# Patient Record
Sex: FEMALE | Race: WHITE | NOT HISPANIC OR LATINO | Employment: FULL TIME | URBAN - METROPOLITAN AREA
[De-identification: names, ages, dates, MRNs, and addresses within clinical notes are randomized per-mention and may not be internally consistent; named-entity substitution may affect disease eponyms.]

---

## 2023-05-18 ENCOUNTER — OFFICE VISIT (OUTPATIENT)
Dept: INTERNAL MEDICINE | Facility: CLINIC | Age: 33
End: 2023-05-18
Payer: COMMERCIAL

## 2023-05-18 VITALS
HEIGHT: 64 IN | BODY MASS INDEX: 22.2 KG/M2 | DIASTOLIC BLOOD PRESSURE: 81 MMHG | OXYGEN SATURATION: 97 % | SYSTOLIC BLOOD PRESSURE: 122 MMHG | TEMPERATURE: 98.4 F | HEART RATE: 85 BPM | WEIGHT: 130 LBS

## 2023-05-18 DIAGNOSIS — K64.9 HEMORRHOIDS, UNSPECIFIED HEMORRHOID TYPE: Chronic | ICD-10-CM

## 2023-05-18 DIAGNOSIS — K56.41 IMPACTED STOOL IN RECTUM: Chronic | ICD-10-CM

## 2023-05-18 DIAGNOSIS — Z13.220 SCREENING FOR LIPID DISORDERS: ICD-10-CM

## 2023-05-18 DIAGNOSIS — K59.00 CONSTIPATION, UNSPECIFIED CONSTIPATION TYPE: Chronic | ICD-10-CM

## 2023-05-18 DIAGNOSIS — Z00.00 ENCOUNTER FOR MEDICAL EXAMINATION TO ESTABLISH CARE: Primary | ICD-10-CM

## 2023-05-18 DIAGNOSIS — Z11.59 NEED FOR HEPATITIS C SCREENING TEST: ICD-10-CM

## 2023-05-18 DIAGNOSIS — Z13.29 SCREENING FOR THYROID DISORDER: ICD-10-CM

## 2023-05-18 LAB
ALBUMIN SERPL-MCNC: 4.3 G/DL (ref 3.5–5.2)
ALBUMIN/GLOB SERPL: 1.6 G/DL
ALP SERPL-CCNC: 30 U/L (ref 39–117)
ALT SERPL W P-5'-P-CCNC: 18 U/L (ref 1–33)
ANION GAP SERPL CALCULATED.3IONS-SCNC: 9 MMOL/L (ref 5–15)
AST SERPL-CCNC: 27 U/L (ref 1–32)
BASOPHILS # BLD AUTO: 0.04 10*3/MM3 (ref 0–0.2)
BASOPHILS NFR BLD AUTO: 1.1 % (ref 0–1.5)
BILIRUB SERPL-MCNC: 0.4 MG/DL (ref 0–1.2)
BUN SERPL-MCNC: 17 MG/DL (ref 6–20)
BUN/CREAT SERPL: 27.4 (ref 7–25)
CALCIUM SPEC-SCNC: 9.2 MG/DL (ref 8.6–10.5)
CHLORIDE SERPL-SCNC: 106 MMOL/L (ref 98–107)
CHOLEST SERPL-MCNC: 143 MG/DL (ref 0–200)
CO2 SERPL-SCNC: 23 MMOL/L (ref 22–29)
CREAT SERPL-MCNC: 0.62 MG/DL (ref 0.57–1)
DEPRECATED RDW RBC AUTO: 45.5 FL (ref 37–54)
EGFRCR SERPLBLD CKD-EPI 2021: 120 ML/MIN/1.73
EOSINOPHIL # BLD AUTO: 0.13 10*3/MM3 (ref 0–0.4)
EOSINOPHIL NFR BLD AUTO: 3.6 % (ref 0.3–6.2)
ERYTHROCYTE [DISTWIDTH] IN BLOOD BY AUTOMATED COUNT: 12.9 % (ref 12.3–15.4)
GLOBULIN UR ELPH-MCNC: 2.7 GM/DL
GLUCOSE SERPL-MCNC: 75 MG/DL (ref 65–99)
HCT VFR BLD AUTO: 35.6 % (ref 34–46.6)
HDLC SERPL-MCNC: 53 MG/DL (ref 40–60)
HGB BLD-MCNC: 12 G/DL (ref 12–15.9)
IMM GRANULOCYTES # BLD AUTO: 0.01 10*3/MM3 (ref 0–0.05)
IMM GRANULOCYTES NFR BLD AUTO: 0.3 % (ref 0–0.5)
LDLC SERPL CALC-MCNC: 80 MG/DL (ref 0–100)
LDLC/HDLC SERPL: 1.54 {RATIO}
LYMPHOCYTES # BLD AUTO: 1.2 10*3/MM3 (ref 0.7–3.1)
LYMPHOCYTES NFR BLD AUTO: 33.1 % (ref 19.6–45.3)
MCH RBC QN AUTO: 32.2 PG (ref 26.6–33)
MCHC RBC AUTO-ENTMCNC: 33.7 G/DL (ref 31.5–35.7)
MCV RBC AUTO: 95.4 FL (ref 79–97)
MONOCYTES # BLD AUTO: 0.34 10*3/MM3 (ref 0.1–0.9)
MONOCYTES NFR BLD AUTO: 9.4 % (ref 5–12)
NEUTROPHILS NFR BLD AUTO: 1.9 10*3/MM3 (ref 1.7–7)
NEUTROPHILS NFR BLD AUTO: 52.5 % (ref 42.7–76)
NRBC BLD AUTO-RTO: 0 /100 WBC (ref 0–0.2)
PLATELET # BLD AUTO: 284 10*3/MM3 (ref 140–450)
PMV BLD AUTO: 10.8 FL (ref 6–12)
POTASSIUM SERPL-SCNC: 4 MMOL/L (ref 3.5–5.2)
PROT SERPL-MCNC: 7 G/DL (ref 6–8.5)
RBC # BLD AUTO: 3.73 10*6/MM3 (ref 3.77–5.28)
SODIUM SERPL-SCNC: 138 MMOL/L (ref 136–145)
TRIGL SERPL-MCNC: 41 MG/DL (ref 0–150)
TSH SERPL DL<=0.05 MIU/L-ACNC: 1.73 UIU/ML (ref 0.27–4.2)
VLDLC SERPL-MCNC: 10 MG/DL (ref 5–40)
WBC NRBC COR # BLD: 3.62 10*3/MM3 (ref 3.4–10.8)

## 2023-05-18 PROCEDURE — 80053 COMPREHEN METABOLIC PANEL: CPT | Performed by: NURSE PRACTITIONER

## 2023-05-18 PROCEDURE — 84443 ASSAY THYROID STIM HORMONE: CPT | Performed by: NURSE PRACTITIONER

## 2023-05-18 PROCEDURE — 80061 LIPID PANEL: CPT | Performed by: NURSE PRACTITIONER

## 2023-05-18 PROCEDURE — 85025 COMPLETE CBC W/AUTO DIFF WBC: CPT | Performed by: NURSE PRACTITIONER

## 2023-05-18 PROCEDURE — 86803 HEPATITIS C AB TEST: CPT | Performed by: NURSE PRACTITIONER

## 2023-05-18 RX ORDER — HYDROCORTISONE ACETATE 25 MG/1
25 SUPPOSITORY RECTAL 2 TIMES DAILY
Qty: 24 EACH | Refills: 3 | Status: SHIPPED | OUTPATIENT
Start: 2023-05-18

## 2023-05-18 NOTE — PROGRESS NOTES
"Chief Complaint  Establish Care and Hemorrhoids (Had them for 10 years - large and wanting to see gastro for removal. )    Subjective          Claire BUTCHER presents to [facility]  Hemorrhoids  This is a recurrent problem. The current episode started more than 1 year ago. The problem occurs constantly. The problem has been gradually worsening. Associated symptoms include a change in bowel habit (has issues with constipation with occassional impaction). Pertinent negatives include no abdominal pain, anorexia, arthralgias, chest pain, chills, congestion, coughing, diaphoresis, fatigue, fever, headaches, joint swelling, myalgias, nausea, neck pain, numbness, rash, sore throat, swollen glands, urinary symptoms, vertigo, visual change, vomiting or weakness.       Previous PCP: [facility]   Specialist(s): None  COVID vaccine: Refused   Colon cancer screening: Patient is not of age. Never done prior. No FHx of Colon Cancer.   Mammogram: Patient is not of age. Never done prior.   Pap Smear: 5 years ago.       Current Outpatient Medications   Medication Instructions   • hydrocortisone (ANUSOL-HC) 25 mg, Rectal, 2 Times Daily       The following portions of the patient's history were reviewed and updated as appropriate: allergies, current medications, past family history, past medical history, past social history, past surgical history, and problem list.    Objective   Vital Signs:   /81 (BP Location: Left arm, Patient Position: Sitting, Cuff Size: Adult)   Pulse 85   Temp 98.4 °F (36.9 °C) (Temporal)   Ht 162.6 cm (64\")   Wt 59 kg (130 lb)   SpO2 97%   BMI 22.31 kg/m²     Wt Readings from Last 3 Encounters:   05/18/23 59 kg (130 lb)     BP Readings from Last 3 Encounters:   05/18/23 122/81     Physical Exam   Appearance: No acute distress, well-nourished  Head: normocephalic, atraumatic  Eyes: extraocular movements intact, no scleral icterus, no " conjunctival injection  Ears, Nose, and Throat: external ears normal, nares patent, moist mucous membranes  Cardiovascular: regular rate and rhythm. no murmurs, rubs, or gallops. no edema  Respiratory: breathing comfortably, symmetric chest rise, clear to auscultation bilaterally. No wheezes, rales, or rhonchi.  Neuro: alert and oriented to time, place, and person. Normal gait  Psych: normal mood and affect     Result Review :   The following data was reviewed by: OSWALDO Washington on 05/18/2023:           No results found for: SARSANTIGEN, COVID19, RAPFLUA, RAPFLUB, FLUAAG, FLUABDAG, FLUABDAG, FLU, FLU, FLUBAG, RAPSCRN, STREPAAG, RSV, POCPREGUR, MONOSPOT, INR, LEADCAPBLD, POCLEAD, BILIRUBINUR    Procedures        Assessment and Plan    Diagnoses and all orders for this visit:    1. Encounter for medical examination to establish care (Primary)    2. Need for hepatitis C screening test  -     HCV Antibody Rfx To Qnt PCR    3. Screening for thyroid disorder  -     TSH Rfx On Abnormal To Free T4    4. Screening for lipid disorders  -     Lipid panel    5. Hemorrhoids, unspecified hemorrhoid type  -     Ambulatory Referral to Gastroenterology  -     Ambulatory Referral to General Surgery  -     hydrocortisone (ANUSOL-HC) 25 MG suppository; Insert 1 suppository into the rectum 2 (Two) Times a Day.  Dispense: 24 each; Refill: 3    6. Constipation, unspecified constipation type  Comments:  increase water intake and fiber intake.   Orders:  -     CBC w AUTO Differential  -     Comprehensive metabolic panel    7. Impacted stool in rectum          There are no discontinued medications.       Follow Up   Return in about 6 weeks (around 6/29/2023) for PAP.  Patient was given instructions and counseling regarding her condition or for health maintenance advice. Please see specific information pulled into the AVS if appropriate.       OSWALDO Washington  05/18/23  12:14 EDT

## 2023-05-19 ENCOUNTER — TELEPHONE (OUTPATIENT)
Dept: INTERNAL MEDICINE | Facility: CLINIC | Age: 33
End: 2023-05-19
Payer: COMMERCIAL

## 2023-05-19 LAB
HCV AB SERPL QL IA: NORMAL
HCV IGG SERPL QL IA: NON REACTIVE

## 2023-05-19 NOTE — TELEPHONE ENCOUNTER
Caller: Claire BUTCHER    Relationship: Self    Best call back number: [phone]     What is the best time to reach you: ANY, VOICEMAIL OK    Who are you requesting to speak with (clinical staff, provider,  specific staff member): CLINICAL      What was the call regarding: PATIENT CALLED WITH QUESTIONS ABOUT HER REFERRALS FOR HEMORRHOIDS. SHE STATES THAT GENERAL SURGERY HAS ALREADY CALLED HER TO SCHEDULE BUT SHE WANTS TO MAKE SURE SHE WASN'T SUPPOSED TO SEE GASTRO FIRST.     Do you require a callback: YES

## 2023-05-24 ENCOUNTER — OFFICE VISIT (OUTPATIENT)
Dept: INTERNAL MEDICINE | Facility: CLINIC | Age: 33
End: 2023-05-24
Payer: COMMERCIAL

## 2023-05-24 VITALS
HEIGHT: 64 IN | HEART RATE: 96 BPM | WEIGHT: 128.8 LBS | SYSTOLIC BLOOD PRESSURE: 106 MMHG | OXYGEN SATURATION: 97 % | DIASTOLIC BLOOD PRESSURE: 75 MMHG | TEMPERATURE: 98.5 F | BODY MASS INDEX: 21.99 KG/M2

## 2023-05-24 DIAGNOSIS — J02.9 PHARYNGITIS, UNSPECIFIED ETIOLOGY: Primary | ICD-10-CM

## 2023-05-24 DIAGNOSIS — Z72.0 CURRENT EVERY DAY NICOTINE VAPING: ICD-10-CM

## 2023-05-24 DIAGNOSIS — J02.9 SORE THROAT: ICD-10-CM

## 2023-05-24 DIAGNOSIS — M54.2 NECK PAIN: ICD-10-CM

## 2023-05-24 DIAGNOSIS — R50.9 FEVER, UNSPECIFIED FEVER CAUSE: ICD-10-CM

## 2023-05-24 LAB
EXPIRATION DATE: NORMAL
EXPIRATION DATE: NORMAL
FLUAV AG UPPER RESP QL IA.RAPID: NOT DETECTED
FLUBV AG UPPER RESP QL IA.RAPID: NOT DETECTED
INTERNAL CONTROL: NORMAL
INTERNAL CONTROL: NORMAL
Lab: NORMAL
Lab: NORMAL
S PYO AG THROAT QL: NEGATIVE
SARS-COV-2 AG UPPER RESP QL IA.RAPID: NOT DETECTED
SARS-COV-2 RNA RESP QL NAA+PROBE: NOT DETECTED

## 2023-05-24 PROCEDURE — 87635 SARS-COV-2 COVID-19 AMP PRB: CPT | Performed by: STUDENT IN AN ORGANIZED HEALTH CARE EDUCATION/TRAINING PROGRAM

## 2023-05-24 PROCEDURE — 87081 CULTURE SCREEN ONLY: CPT | Performed by: STUDENT IN AN ORGANIZED HEALTH CARE EDUCATION/TRAINING PROGRAM

## 2023-05-24 NOTE — PROGRESS NOTES
"Chief Complaint  Sore Throat (Burning, started last night. ) and Headache    Subjective          Claire BUTCHER presents to [facility]  History of Present Illness    Here for a sick visit.  Here with complaints of sore throat and headache.  Tactile fever (not measured).  No cough or congestion.  No vomiting or diarrhea.  Tolerating PO.    Symptoms started last night.    Of note, quite smoking tobacco February last year, and now vapes nicotine containing fluid.    Current Outpatient Medications   Medication Instructions   • hydrocortisone (ANUSOL-HC) 25 mg, Rectal, 2 Times Daily   • phenol (CHLORASEPTIC) 1.4 % liquid liquid 1 spray, Mouth/Throat, Every 2 Hours PRN       The following portions of the patient's history were reviewed and updated as appropriate: allergies, current medications, past family history, past medical history, past social history, past surgical history, and problem list.    Objective   Vital Signs:   /75 (BP Location: Left arm, Patient Position: Sitting)   Pulse 96   Temp 98.5 °F (36.9 °C) (Temporal)   Ht 162.6 cm (64\")   Wt 58.4 kg (128 lb 12.8 oz)   SpO2 97%   BMI 22.11 kg/m²     Wt Readings from Last 3 Encounters:   05/24/23 58.4 kg (128 lb 12.8 oz)   05/18/23 59 kg (130 lb)     BP Readings from Last 3 Encounters:   05/24/23 106/75   05/18/23 122/81     Physical Exam  Vitals reviewed.   Constitutional:       General: She is not in acute distress.     Appearance: Normal appearance. She is not ill-appearing, toxic-appearing or diaphoretic.   HENT:      Head: Normocephalic and atraumatic.      Right Ear: External ear normal.      Left Ear: External ear normal.      Mouth/Throat:      Mouth: Mucous membranes are moist.      Pharynx: Oropharynx is clear. Posterior oropharyngeal erythema present. No oropharyngeal exudate.   Eyes:      Conjunctiva/sclera: Conjunctivae normal.   Cardiovascular:      Rate and Rhythm: Normal rate and regular " rhythm.      Pulses: Normal pulses.      Heart sounds: Normal heart sounds. No murmur heard.    No friction rub. No gallop.   Pulmonary:      Effort: Pulmonary effort is normal. No respiratory distress.      Breath sounds: Normal breath sounds. No stridor. No wheezing, rhonchi or rales.   Chest:      Chest wall: No tenderness.   Abdominal:      General: Abdomen is flat.      Palpations: Abdomen is soft. There is no mass.      Tenderness: There is no abdominal tenderness.   Musculoskeletal:      Cervical back: No rigidity.      Right lower leg: No edema.      Left lower leg: No edema.   Lymphadenopathy:      Cervical: Cervical adenopathy present.   Skin:     General: Skin is warm and dry.   Neurological:      General: No focal deficit present.      Mental Status: She is alert. Mental status is at baseline.   Psychiatric:         Mood and Affect: Mood normal.         Behavior: Behavior normal.         Thought Content: Thought content normal.         Judgment: Judgment normal.            Result Review :   The following data was reviewed by: Jimenez Enamorado MD on 05/24/2023:  Common labs        5/18/2023    11:39   Common Labs   Glucose 75     BUN 17     Creatinine 0.62     Sodium 138     Potassium 4.0     Chloride 106     Calcium 9.2     Albumin 4.3     Total Bilirubin 0.4     Alkaline Phosphatase 30     AST (SGOT) 27     ALT (SGPT) 18     WBC 3.62     Hemoglobin 12.0     Hematocrit 35.6     Platelets 284     Total Cholesterol 143     Triglycerides 41     HDL Cholesterol 53     LDL Cholesterol  80              Lab Results   Component Value Date    SARSANTIGEN Not Detected 05/24/2023    FLUAAG Not Detected 05/24/2023    FLUBAG Not Detected 05/24/2023    RAPSCRN Negative 05/24/2023       Procedures        Assessment and Plan    Diagnoses and all orders for this visit:    1. Pharyngitis, unspecified etiology (Primary)  -     phenol (CHLORASEPTIC) 1.4 % liquid liquid; Apply 1 spray to the mouth or throat Every 2 (Two)  Hours As Needed (sore throat).  Dispense: 177 mL; Refill: 0    2. Fever, unspecified fever cause  -     POCT SARS-CoV-2 Antigen SHAISTA + Flu  -     POCT rapid strep A  -     COVID-19,APTIMA PANTHER([place]),[facility], NP/OP SWAB IN UTM/VTM/SALINE TRANSPORT MEDIA,24 HR TAT - Swab, Nasopharynx    3. Sore throat  -     POCT rapid strep A  -     Beta Strep Culture, Throat - , Throat; Future  -     COVID-19,APTIMA PANTHER([place]),[facility], NP/OP SWAB IN UTM/VTM/SALINE TRANSPORT MEDIA,24 HR TAT - Swab, Nasopharynx  -     Beta Strep Culture, Throat - Swab, Throat    4. Neck pain    5. Current every day nicotine vaping      Vaping:  -counseled today on the health risks of vaping  -strongly counseled today on the importance of vaping cessation    There are no discontinued medications.       Follow Up   Return if symptoms worsen or fail to improve.  Patient was given instructions and counseling regarding her condition or for health maintenance advice. Please see specific information pulled into the AVS if appropriate.       Jimenez Enamorado MD  05/24/23  13:21 EDT

## 2023-05-25 ENCOUNTER — OFFICE VISIT (OUTPATIENT)
Dept: INTERNAL MEDICINE | Facility: CLINIC | Age: 33
End: 2023-05-25
Payer: COMMERCIAL

## 2023-05-25 VITALS
HEART RATE: 97 BPM | DIASTOLIC BLOOD PRESSURE: 65 MMHG | OXYGEN SATURATION: 97 % | BODY MASS INDEX: 21.95 KG/M2 | TEMPERATURE: 98.2 F | SYSTOLIC BLOOD PRESSURE: 99 MMHG | HEIGHT: 64 IN | WEIGHT: 128.6 LBS

## 2023-05-25 DIAGNOSIS — J02.9 PHARYNGITIS, UNSPECIFIED ETIOLOGY: Primary | ICD-10-CM

## 2023-05-25 NOTE — PROGRESS NOTES
"Chief Complaint  Sore Throat (Still not better. States she is more swollen in her throat, face and neck. Unable to turn neck to the left without significant pain. Denies any trouble breathing. Requesting abx shot. Seen at Urgent Care yesterday- put on amoxicillin. )    Subjective          Claire BUTCHER presents to [facility]  History of Present Illness    Seen at urgent care yesterday, with repeat strep testing negative.  Started on amoxicillin.  Reports throat pain worse, and would like antibiotic injection.    Had fever yesterday (100.4F at home).  Other symptoms include headache, left ear otalgia, headache.  No cough, no congestion, no vomiting, no diarrhea, no respiratory distress.  Tolerating PO.    Current Outpatient Medications   Medication Instructions   • hydrocortisone (ANUSOL-HC) 25 mg, Rectal, 2 Times Daily   • phenol (CHLORASEPTIC) 1.4 % liquid liquid 1 spray, Mouth/Throat, Every 2 Hours PRN       The following portions of the patient's history were reviewed and updated as appropriate: allergies, current medications, past family history, past medical history, past social history, past surgical history, and problem list.    Objective   Vital Signs:   BP 99/65 (BP Location: Left arm, Patient Position: Sitting)   Pulse 97   Temp 98.2 °F (36.8 °C) (Temporal)   Ht 162.6 cm (64\")   Wt 58.3 kg (128 lb 9.6 oz)   SpO2 97%   BMI 22.07 kg/m²     Wt Readings from Last 3 Encounters:   05/25/23 58.3 kg (128 lb 9.6 oz)   05/24/23 58.4 kg (128 lb 12.8 oz)   05/24/23 58.4 kg (128 lb 12.8 oz)     BP Readings from Last 3 Encounters:   05/25/23 99/65   05/24/23 109/70   05/24/23 106/75     Physical Exam  Vitals reviewed.   Constitutional:       General: She is not in acute distress.     Appearance: Normal appearance. She is not ill-appearing, toxic-appearing or diaphoretic.   HENT:      Head: Normocephalic and atraumatic.      Right Ear: Tympanic membrane, ear canal and " external ear normal.      Left Ear: Tympanic membrane, ear canal and external ear normal.      Mouth/Throat:      Pharynx: Oropharyngeal exudate present.      Comments: White patch on left tonsil, noted  Eyes:      Conjunctiva/sclera: Conjunctivae normal.   Cardiovascular:      Rate and Rhythm: Normal rate and regular rhythm.      Pulses: Normal pulses.      Heart sounds: Normal heart sounds. No murmur heard.    No friction rub. No gallop.   Pulmonary:      Effort: Pulmonary effort is normal. No respiratory distress.      Breath sounds: Normal breath sounds. No stridor. No wheezing, rhonchi or rales.   Chest:      Chest wall: No tenderness.   Abdominal:      General: Abdomen is flat.      Palpations: Abdomen is soft. There is no mass.      Tenderness: There is no abdominal tenderness.   Musculoskeletal:      Right lower leg: No edema.      Left lower leg: No edema.   Skin:     General: Skin is warm and dry.   Neurological:      General: No focal deficit present.      Mental Status: She is alert. Mental status is at baseline.   Psychiatric:         Behavior: Behavior normal.         Thought Content: Thought content normal.         Judgment: Judgment normal.            Result Review :   The following data was reviewed by: Jimenez Enamorado MD on 05/25/2023:  Common labs        5/18/2023    11:39   Common Labs   Glucose 75     BUN 17     Creatinine 0.62     Sodium 138     Potassium 4.0     Chloride 106     Calcium 9.2     Albumin 4.3     Total Bilirubin 0.4     Alkaline Phosphatase 30     AST (SGOT) 27     ALT (SGPT) 18     WBC 3.62     Hemoglobin 12.0     Hematocrit 35.6     Platelets 284     Total Cholesterol 143     Triglycerides 41     HDL Cholesterol 53     LDL Cholesterol  80              Lab Results   Component Value Date    SARSANTIGEN Not Detected 05/24/2023    COVID19 Not Detected 05/24/2023    FLUAAG Not Detected 05/24/2023    FLUBAG Not Detected 05/24/2023    RAPSCRN Negative 05/24/2023       Procedures         Assessment and Plan    Diagnoses and all orders for this visit:    1. Pharyngitis, unspecified etiology (Primary)  -     Penicillin G Benzathine (BICILLIN-LA) injection 1.2 Million Units          Medications Discontinued During This Encounter   Medication Reason   • amoxicillin (AMOXIL) 500 MG capsule           Follow Up   Return if symptoms worsen or fail to improve.  Patient was given instructions and counseling regarding her condition or for health maintenance advice. Please see specific information pulled into the AVS if appropriate.       Jimenez Enamorado MD  05/25/23  09:34 EDT

## 2023-05-26 LAB — BACTERIA SPEC AEROBE CULT: NORMAL

## 2023-06-08 ENCOUNTER — PREP FOR SURGERY (OUTPATIENT)
Dept: SURGERY | Facility: CLINIC | Age: 33
End: 2023-06-08

## 2023-06-08 ENCOUNTER — OFFICE VISIT (OUTPATIENT)
Dept: SURGERY | Facility: CLINIC | Age: 33
End: 2023-06-08
Payer: COMMERCIAL

## 2023-06-08 VITALS — BODY MASS INDEX: 22.2 KG/M2 | HEIGHT: 64 IN | WEIGHT: 130 LBS | RESPIRATION RATE: 16 BRPM

## 2023-06-08 DIAGNOSIS — K64.9 HEMORRHOIDS, UNSPECIFIED HEMORRHOID TYPE: Primary | ICD-10-CM

## 2023-06-08 DIAGNOSIS — K64.9 HEMORRHOIDS: Primary | ICD-10-CM

## 2023-06-08 RX ORDER — SODIUM CHLORIDE, SODIUM LACTATE, POTASSIUM CHLORIDE, CALCIUM CHLORIDE 600; 310; 30; 20 MG/100ML; MG/100ML; MG/100ML; MG/100ML
100 INJECTION, SOLUTION INTRAVENOUS CONTINUOUS
OUTPATIENT
Start: 2023-06-08

## 2023-06-08 RX ORDER — SODIUM CHLORIDE 0.9 % (FLUSH) 0.9 %
10 SYRINGE (ML) INJECTION AS NEEDED
OUTPATIENT
Start: 2023-06-08

## 2023-06-08 RX ORDER — SODIUM CHLORIDE 0.9 % (FLUSH) 0.9 %
10 SYRINGE (ML) INJECTION EVERY 12 HOURS SCHEDULED
OUTPATIENT
Start: 2023-06-08

## 2023-06-08 RX ORDER — SODIUM CHLORIDE 9 MG/ML
40 INJECTION, SOLUTION INTRAVENOUS AS NEEDED
OUTPATIENT
Start: 2023-06-08

## 2023-06-08 NOTE — PROGRESS NOTES
Chief Complaint: Hemorrhoids    Subjective         History of Present Illness  Claire BUTCHER is a 33 y.o. female presents to [facility]. The patient is to be seen for hemorrhoids.    The patient is to be seen for hemorrhoids.    Hemorrhoids  The patient reports that she has been experiencing hemorrhoids intermittently for approximately 9 years. She was referred by OSWALDO Washington. She states that it started with pregnancy 9 years ago and has worsened. The patient reports severe constipation without a bowel movement for 2 weeks while pregnant. She states that when the hemorrhoid would become external in the beginning, the skin stayed there and never retracted. She can feel internal and external hemorrhoids. Every time she has a bowel movement she bleeds and causes a new hemorrhoid or causes it to flare up. The patient reports that approximately 3 weeks ago, for approximately 1 month, the pain was 10 out of 10. She states that she could not stand for more than 1 minute at a time and had to be sedentary. She states that she that she has tried warm baths, suppositories, and ibuprofen, without good results. She does not take anticoagulants. She has a history of wisdom tooth extraction and tubal ligation. She admits to nausea after anesthesia.    Objective     Past Medical History:   Diagnosis Date    Anemia [id number]    Pregnancy Anemia    PONV (postoperative nausea and vomiting) 07/01/2018    Nausea after tubal surgery    Rectal bleeding 01/01/2014    First pregnancy, on and off since 2014       Past Surgical History:   Procedure Laterality Date    TUBAL ABDOMINAL LIGATION  July 2018    No complications, anesthesia did cause nausea    WISDOM TOOTH EXTRACTION           Current Outpatient Medications:     hydrocortisone (ANUSOL-HC) 25 MG suppository, Insert 1 suppository into the rectum 2 (Two) Times a Day., Disp: 24 each, Rfl: 3    phenol (CHLORASEPTIC) 1.4 % liquid liquid,  "Apply 1 spray to the mouth or throat Every 2 (Two) Hours As Needed (sore throat). (Patient not taking: Reported on 2023), Disp: 177 mL, Rfl: 0    No Known Allergies     Family History   Problem Relation Age of Onset    Other Mother         High Cholesterol    COPD Maternal Grandmother              Other Maternal Grandmother         High Cholesterol       Social History     Socioeconomic History    Marital status:    Tobacco Use    Smoking status: Former     Packs/day: 1.00     Years: 15.00     Pack years: 15.00     Types: Cigarettes     Start date: 2004     Quit date: 2022     Years since quittin.3    Smokeless tobacco: Never    Tobacco comments:     Smoked tobacco cigarettes for 18 years, then switched to vape 1 year ago   Vaping Use    Vaping Use: Every day    Start date: 2022    Substances: Nicotine   Substance and Sexual Activity    Alcohol use: Yes     Alcohol/week: 4.0 - 6.0 standard drinks     Types: 4 - 6 Drinks containing 0.5 oz of alcohol per week    Drug use: Never    Sexual activity: Yes     Partners: Male     Birth control/protection: Tubal ligation       Vital Signs:   Resp 16   Ht 162.6 cm (64\")   Wt 59 kg (130 lb)   BMI 22.31 kg/m²      Physical Exam  Exam conducted with a chaperone present.   Genitourinary:     Rectum: External hemorrhoid present.      Comments: In her perianal area, she has some fairly large external hemorrhoids with associated skin tag.  No acute distress. Nonlabored breathing. Abdomen is soft.         Result Review :            Procedures        Assessment and Plan    There are no diagnoses linked to this encounter.    1. External hemorrhoids.       -    We will plan for hemorrhoidectomy in the future. Risks, benefits, alternatives of the procedure were discussed extensively. All questions were answered. Patient voiced understanding and agreed to proceed with the above plan.        Follow Up   No follow-ups on file.  Patient was given " instructions and counseling regarding her condition or for health maintenance advice. Please see specific information pulled into the AVS if appropriate.       Transcribed from ambient dictation for Raudel Kang MD by Alisha Ewing.  06/08/23   11:38 EDT    Patient or patient representative verbalized consent to the visit recording.  I have personally performed the services described in this document as transcribed by the above individual, and it is both accurate and complete.

## 2023-07-25 ENCOUNTER — TELEPHONE (OUTPATIENT)
Dept: SURGERY | Facility: CLINIC | Age: 33
End: 2023-07-25

## 2023-07-25 NOTE — TELEPHONE ENCOUNTER
SAME DAY CANCEL APPT    Caller: Claire BUTCHER     Relationship to patient: Self     Best call back number: [phone]     Patient is needing: SAME DAY CANCEL APPT; PT IS NOT HAVING ANY SYMPTOMS AND WILL SEE THE DR ON  8.22.23

## 2023-08-01 ENCOUNTER — HOSPITAL ENCOUNTER (OUTPATIENT)
Dept: ULTRASOUND IMAGING | Facility: HOSPITAL | Age: 33
Discharge: HOME | End: 2023-08-01
Payer: COMMERCIAL

## 2023-08-01 ENCOUNTER — HOSPITAL ENCOUNTER (OUTPATIENT)
Dept: MAMMOGRAPHY | Facility: HOSPITAL | Age: 33
Discharge: HOME | End: 2023-08-01
Payer: COMMERCIAL

## 2023-08-01 DIAGNOSIS — N64.4 BREAST PAIN, RIGHT: ICD-10-CM

## 2023-08-01 PROCEDURE — G0279 TOMOSYNTHESIS, MAMMO: HCPCS

## 2023-08-01 PROCEDURE — 77066 DX MAMMO INCL CAD BI: CPT

## 2023-08-22 ENCOUNTER — TELEPHONE (OUTPATIENT)
Dept: SURGERY | Facility: CLINIC | Age: 33
End: 2023-08-22

## 2023-08-22 NOTE — TELEPHONE ENCOUNTER
SAME DAY CANCEL POST OP APPT     Caller: Claire BUTCHER      Relationship to patient: Self      Best call back number: [phone]      Patient is needing: SAME DAY CANCEL APPT; FAMILY [facility]Y

## 2023-09-12 ENCOUNTER — OFFICE VISIT (OUTPATIENT)
Dept: SURGERY | Facility: CLINIC | Age: 33
End: 2023-09-12
Payer: COMMERCIAL

## 2023-09-12 VITALS — BODY MASS INDEX: 22.71 KG/M2 | RESPIRATION RATE: 16 BRPM | HEIGHT: 64 IN | WEIGHT: 133 LBS

## 2023-09-12 DIAGNOSIS — K64.9 HEMORRHOIDS, UNSPECIFIED HEMORRHOID TYPE: Primary | ICD-10-CM

## 2023-09-12 PROCEDURE — 99024 POSTOP FOLLOW-UP VISIT: CPT | Performed by: SURGERY

## 2023-09-12 PROCEDURE — 1160F RVW MEDS BY RX/DR IN RCRD: CPT | Performed by: SURGERY

## 2023-09-12 PROCEDURE — 1159F MED LIST DOCD IN RCRD: CPT | Performed by: SURGERY

## 2023-09-12 NOTE — PROGRESS NOTES
"Chief Complaint  Post-op    Subjective        Claire BUTCHER presents to [facility] for follow-up after hemorrhoidectomy.    History of Present Illness    Status post hemorrhoidectomy  The patient reports she is back to normal after surgery in 07/2023. She is eating, drinking, urinating, and having bowel movements. The patient states there is nothing that causes a lot of pain, but it feels like a rubbing of the wound. The patient would like to go over what the complete healing time is. She states there was blood the last time she went to the bathroom. The patient reports the only time there is pain is if she continuously keeps having to go to the bathroom. She can feel the pressure of it being there, but it just takes several bathroom trips. The patient reports a little bit of tag left and that part gets sore and it hurts to touch, but it is not a hemorrhoid like it felt before. She states when she goes to the bathroom, it feels like it is coming out against the wound. The patient reports it does not hurt, but it feels different. She states she has no pain walking.      Objective   Vital Signs:  Resp 16   Ht 162.6 cm (64\")   Wt 60.3 kg (133 lb)   BMI 22.83 kg/m²   Estimated body mass index is 22.83 kg/m² as calculated from the following:    Height as of this encounter: 162.6 cm (64\").    Weight as of this encounter: 60.3 kg (133 lb).       BMI is within normal parameters. No other follow-up for BMI required.      Physical Exam  Constitutional:       General: She is not in acute distress.     Appearance: Normal appearance. She is well-developed and normal weight.   Pulmonary:      Effort: Pulmonary effort is normal.      Breath sounds: Normal air entry.   Abdominal:      General: There is no distension.      Palpations: Abdomen is soft.      Tenderness: There is no abdominal tenderness.   Skin:     Comments: Perianal area: There is a very small skin tag around the 12 o'clock " position. Otherwise, everything looks pretty normal, pretty well healed. No signs of bleeding. No signs of infection.      Result Review :                   Assessment and Plan       Assessment and Plan   Diagnoses and all orders for this visit:      1. Status post hemorrhoidectomy.  She is still having some difficulty and pain with bowel movements. We will call her in some nifedipine cream to see if that will help heal the perianal area and then she will follow up. She will need to call and make a follow-up appointment in 2 to 3 weeks after beginning to use this cream, and we will see how she is doing. We may need to do a rectal exam under anesthesia. The patient reports she does not really want an exam under anesthesia if she can avoid it because she does not want to have to get a ride. We may try and do a speculum exam in the office if the cream does not seem to be helping. Discussed with the patient. All questions were answered. She voiced understanding and agrees to the above plan.         Follow Up   No follow-ups on file.  Patient was given instructions and counseling regarding her condition or for health maintenance advice. Please see specific information pulled into the AVS if appropriate.     Transcribed from ambient dictation for Raudel Kang MD by Lary Ocasio.  09/12/23   15:43 EDT    Patient or patient representative verbalized consent to the visit recording.  I have personally performed the services described in this document as transcribed by the above individual, and it is both accurate and complete.